# Patient Record
(demographics unavailable — no encounter records)

---

## 2025-07-24 NOTE — ASSESSMENT
[FreeTextEntry1] : Stool for GPP, fecal calprotectin. C'diff and O are  P  Office follow up in 1 to 2 weeks   I spent 34 minutes with the patient and answered all of  his questions.

## 2025-07-24 NOTE — HISTORY OF PRESENT ILLNESS
[FreeTextEntry1] : He is a 21-year-old male who complains of diarrhea for the past one and a half weeks.  He denies rectal bleeding abdominal pain or weight loss.  He denies a fever or chills.  Does admit to travel history recently.  He denies being on antibiotics over the past 6 months.  He denies increased stress in his life.

## 2025-07-24 NOTE — CONSULT LETTER
[Dear  ___] : Dear  [unfilled], [Consult Letter:] : I had the pleasure of evaluating your patient, [unfilled]. [( Thank you for referring [unfilled] for consultation for _____ )] : Thank you for referring [unfilled] for consultation for [unfilled] [Please see my note below.] : Please see my note below. [Consult Closing:] : Thank you very much for allowing me to participate in the care of this patient.  If you have any questions, please do not hesitate to contact me. [Sincerely,] : Sincerely, [FreeTextEntry3] : Francisco Lin MD  [facility]

## 2025-07-24 NOTE — CONSULT LETTER
[Dear  ___] : Dear  [unfilled], [Consult Letter:] : I had the pleasure of evaluating your patient, [unfilled]. [( Thank you for referring [unfilled] for consultation for _____ )] : Thank you for referring [unfilled] for consultation for [unfilled] [Please see my note below.] : Please see my note below. [Consult Closing:] : Thank you very much for allowing me to participate in the care of this patient.  If you have any questions, please do not hesitate to contact me. [Sincerely,] : Sincerely, [FreeTextEntry3] : Francisco Lin MD  [facility]